# Patient Record
(demographics unavailable — no encounter records)

---

## 2025-07-18 NOTE — HISTORY OF PRESENT ILLNESS
[Mother] : mother [Vitamins] : takes vitamins  [Eats healthy meals and snacks] : eats healthy meals and snacks [Eats meals with family] : eats meals with family [Normal] : Normal [In own bed] : In own bed [Brushing teeth twice/d] : brushing teeth twice per day [Yes] : Patient goes to dentist yearly [Toothpaste] : Primary Fluoride Source: Toothpaste [Playtime (60 min/d)] : playtime 60 min a day [Participates in after-school activities] : participates in after-school activities [< 2 hrs of screen time per day] : less than 2 hrs of screen time per day [Appropiate parent-child-sibling interaction] : appropriate parent-child-sibling interaction [Has Friends] : has friends [Has chance to make own decisions] : has chance to make own decisions [Grade ___] : Grade [unfilled] [Adequate social interactions] : adequate social interactions [Adequate behavior] : adequate behavior [Adequate performance] : adequate performance [Adequate attention] : adequate attention [No difficulties with Homework] : no difficulties with homework [No] : No cigarette smoke exposure [Appropriately restrained in motor vehicle] : appropriately restrained in motor vehicle [Supervised outdoor play] : supervised outdoor play [Supervised around water] : supervised around water [Wears helmet and pads] : wears helmet and pads [Parent knows child's friends] : parent knows child's friends [Parent discusses safety rules regarding adults] : parent discusses safety rules regarding adults [Family discusses home emergency plan] : family discusses home emergency plan [Monitored computer use] : monitored computer use [Up to date] : Up to date [Exposure to alcohol] : no exposure to alcohol [Exposure to tobacco] : no exposure to tobacco [Exposure to electronic nicotine delivery system] : No exposure to electronic nicotine delivery system [Exposure to illicit drugs] : no exposure to illicit drugs [de-identified] : none [de-identified] : Does not eat healthy - prefers fast food/junk food.  [FreeTextEntry9] : suni, student [employer], cello, school play [de-identified] : likes science.  [de-identified] : due for TDAP.  d/w mom HPV.

## 2025-07-18 NOTE — HISTORY OF PRESENT ILLNESS
[Mother] : mother [Vitamins] : takes vitamins  [Eats healthy meals and snacks] : eats healthy meals and snacks [Eats meals with family] : eats meals with family [Normal] : Normal [In own bed] : In own bed [Brushing teeth twice/d] : brushing teeth twice per day [Yes] : Patient goes to dentist yearly [Toothpaste] : Primary Fluoride Source: Toothpaste [Playtime (60 min/d)] : playtime 60 min a day [Participates in after-school activities] : participates in after-school activities [< 2 hrs of screen time per day] : less than 2 hrs of screen time per day [Appropiate parent-child-sibling interaction] : appropriate parent-child-sibling interaction [Has Friends] : has friends [Has chance to make own decisions] : has chance to make own decisions [Grade ___] : Grade [unfilled] [Adequate social interactions] : adequate social interactions [Adequate behavior] : adequate behavior [Adequate performance] : adequate performance [Adequate attention] : adequate attention [No difficulties with Homework] : no difficulties with homework [No] : No cigarette smoke exposure [Appropriately restrained in motor vehicle] : appropriately restrained in motor vehicle [Supervised outdoor play] : supervised outdoor play [Supervised around water] : supervised around water [Wears helmet and pads] : wears helmet and pads [Parent knows child's friends] : parent knows child's friends [Parent discusses safety rules regarding adults] : parent discusses safety rules regarding adults [Family discusses home emergency plan] : family discusses home emergency plan [Monitored computer use] : monitored computer use [Up to date] : Up to date [Exposure to alcohol] : no exposure to alcohol [Exposure to tobacco] : no exposure to tobacco [Exposure to electronic nicotine delivery system] : No exposure to electronic nicotine delivery system [Exposure to illicit drugs] : no exposure to illicit drugs [de-identified] : none [de-identified] : Does not eat healthy - prefers fast food/junk food.  [FreeTextEntry9] : suni, student [employer], cello, school play [de-identified] : likes science.  [de-identified] : due for TDAP.  d/w mom HPV.

## 2025-07-18 NOTE — CARE PLAN
[Care Plan reviewed and provided to patient/caregiver] : Care plan reviewed and provided to patient/caregiver [Understands and communicates without difficulty] : Patient/Caregiver understands and communicates without difficulty [FreeTextEntry2] : To decrease junkfood/fast food.  Increase activity.  Choose healthier options.  [FreeTextEntry3] : - Increase fruits/vegetables to minimum 5 per day  - No packaged foods/junkfood/fast food.    - Decrease starches - breads/pasta/rice.  - NO sodas/juice- drink water only,  - Decrease screen time to <2 hr daily  - To keep a food diary & exercise diary  - To do at least 1 hour of exercise a day.   Labs ordered - h/o elevated LDL - will follow up and consider nutrition/cardio dependent on results.

## 2025-07-18 NOTE — PHYSICAL EXAM
[Alert] : alert [No Acute Distress] : no acute distress [Normocephalic] : normocephalic [Conjunctivae with no discharge] : conjunctivae with no discharge [PERRL] : PERRL [EOMI Bilateral] : EOMI bilateral [Auricles Well Formed] : auricles well formed [Clear Tympanic membranes with present light reflex and bony landmarks] : clear tympanic membranes with present light reflex and bony landmarks [No Discharge] : no discharge [Nares Patent] : nares patent [Pink Nasal Mucosa] : pink nasal mucosa [Palate Intact] : palate intact [Nonerythematous Oropharynx] : nonerythematous oropharynx [Supple, full passive range of motion] : supple, full passive range of motion [No Palpable Masses] : no palpable masses [Symmetric Chest Rise] : symmetric chest rise [Clear to Auscultation Bilaterally] : clear to auscultation bilaterally [Regular Rate and Rhythm] : regular rate and rhythm [Normal S1, S2 present] : normal S1, S2 present [No Murmurs] : no murmurs [+2 Femoral Pulses] : +2 femoral pulses [Soft] : soft [NonTender] : non tender [Non Distended] : non distended [Normoactive Bowel Sounds] : normoactive bowel sounds [No Hepatomegaly] : no hepatomegaly [No Splenomegaly] : no splenomegaly [Testicles Descended Bilaterally] : testicles descended bilaterally [Patent] : patent [No fissures] : no fissures [No Abnormal Lymph Nodes Palpated] : no abnormal lymph nodes palpated [No Gait Asymmetry] : no gait asymmetry [No pain or deformities with palpation of bone, muscles, joints] : no pain or deformities with palpation of bone, muscles, joints [Normal Muscle Tone] : normal muscle tone [Straight] : straight [+2 Patella DTR] : +2 patella DTR [Cranial Nerves Grossly Intact] : cranial nerves grossly intact [No Rash or Lesions] : no rash or lesions [Cooperative] : cooperative [Rhett: _____] : Rhett [unfilled]

## 2025-07-18 NOTE — DISCUSSION/SUMMARY
[Normal Development] : development  [No Elimination Concerns] : elimination [No Skin Concerns] : skin [Normal Sleep Pattern] : sleep [Anticipatory Guidance Given] : Anticipatory guidance addressed as per the history of present illness section [School] : school [Development and Mental Health] : development and mental health [Nutrition and Physical Activity] : nutrition and physical activity [Oral Health] : oral health [Safety] : safety [Patient] : patient [Mother] : mother [Parent/Guardian] : Parent/Guardian [Full Activity without restrictions including Physical Education & Athletics] : Full Activity without restrictions including Physical Education & Athletics [] : The components of the vaccine(s) to be administered today are listed in the plan of care. The disease(s) for which the vaccine(s) are intended to prevent and the risks have been discussed with the caretaker.  The risks are also included in the appropriate vaccination information statements which have been provided to the patient's caregiver.  The caregiver has given consent to vaccinate. [FreeTextEntry1] :  10 y/o male currently well with BMI > 99% and poor eating habits.   d/w mom and patient balanced diet with all food groups. AAP 5210 reviewed - increase fruits/vegetables, NO sodas/juice- drink water only, <2 hr TV/screen time and at least 1 hour of exercise a day.  To stop/limit junkfood/fast foods - drink more water.  Labs ordered - h/o elevated LDL - will follow.  Brush teeth twice a day with toothbrush. Recommend visit to dentist.  Help child to maintain consistent daily routines and sleep schedule.  Puberty/development/hygiene reviewed.  School discussed.  Ensure home is safe. Teach child about personal safety. Water and sun safety discussed.  Use consistent, positive discipline.  Limit screen time to no more than 2 hours per day. Encourage physical activity. Vaccines due for Tdap booster.  d/w parent HPV vaccine - info given for review.  Masking, social distancing and hand hygiene reviewed. Return 1 year for routine well child check. Return sooner PRN Mom without questions at this time.

## 2025-07-18 NOTE — COUNSELING
[Use of Plain Language] : use of plain language [Teach Back Method] : teach back method [Education Material/Resources Provided] : education material/resources provided [Needs Reinforcement, Provided] : needs reinforcement, provided [Behavioral] : behavioral